# Patient Record
(demographics unavailable — no encounter records)

---

## 2025-06-25 NOTE — HISTORY OF PRESENT ILLNESS
[FreeTextEntry1] : 79 year old female with pmhx of  stroke, CVA, HTN, HLD  PT presents with ulcer to L foot that has worsen over past few weeks. Patient is accompanied by daughter and son in law. Daughter is currently caretaker. She was recently seen and discharged from [facility]. She is currently bedbound and as per daughter, has been since her stroke. She is an unreliable historian and is unable to properly communicate with the medical team. She has been on oral antibiotics which are nearly completed. Her daughter denies any recent n/v/c/f/sob.

## 2025-06-25 NOTE — ASSESSMENT
[FreeTextEntry1] : Patient presents with left heel wound with likely underlying osteomyelitis (chronic). Patient seen and evaluated. Radiographs evaluated from recent hospital visit at [facility]. Show significant erosions at the posterior and plantar heel consistent with osteomyelitis.  Patient currently bedbound and not AAO. Unable to obtain COSME due to patient habitus. Through clinical evaluation however, she is likely a vascularpath with significant vascular disease.  Discussed at length with family members that in order for wound healing to commence, she will need increased blood flow to the limb which she is likely not to get now. Performing a debridement may cause significant damage to the limb at the moment.  Patient is referred for vascular consultation. regarding chronic OM, discussed various situation with family members. Long term IV abx vs surgical debridement versus BKA. The first two would require adequate perfusion of the limb however. Her likely underlying osteomyelitis is chronic and appears stable for now. A BKA maybe the best procedure in this situation if no vascular intervention can be performed but this is subject to change.  No debridement performed today. All questions answered.  She will follow up in 4 weeks. If signs of infection occur, she will go the ER immediately.

## 2025-06-25 NOTE — PHYSICAL EXAM
[0] : left 0 [FreeTextEntry1] : heel [FreeTextEntry2] : 7.0 [FreeTextEntry3] : 5.0 [FreeTextEntry4] : 2.0 [de-identified] : serous [de-identified] : 9oclock 1.0cm [TWNoteComboBox1] : Left [TWNoteComboBox4] : Small [TWNoteComboBox2] : 4 [TWNoteComboBox5] : Yes [TWNoteComboBox6] : Pressure [de-identified] : No [de-identified] : Normal [de-identified] : None [de-identified] : None [de-identified] : 50% [de-identified] : Yes [de-identified] : False